# Patient Record
Sex: FEMALE | URBAN - METROPOLITAN AREA
[De-identification: names, ages, dates, MRNs, and addresses within clinical notes are randomized per-mention and may not be internally consistent; named-entity substitution may affect disease eponyms.]

---

## 2023-06-02 ENCOUNTER — OFFICE VISIT (OUTPATIENT)
Dept: URBAN - METROPOLITAN AREA CLINIC 74 | Facility: CLINIC | Age: 63
End: 2023-06-02

## 2023-06-29 ENCOUNTER — WEB ENCOUNTER (OUTPATIENT)
Dept: URBAN - METROPOLITAN AREA CLINIC 74 | Facility: CLINIC | Age: 63
End: 2023-06-29

## 2023-06-29 ENCOUNTER — OFFICE VISIT (OUTPATIENT)
Dept: URBAN - METROPOLITAN AREA CLINIC 74 | Facility: CLINIC | Age: 63
End: 2023-06-29
Payer: COMMERCIAL

## 2023-06-29 ENCOUNTER — LAB OUTSIDE AN ENCOUNTER (OUTPATIENT)
Dept: URBAN - METROPOLITAN AREA CLINIC 74 | Facility: CLINIC | Age: 63
End: 2023-06-29

## 2023-06-29 VITALS
BODY MASS INDEX: 25.04 KG/M2 | WEIGHT: 132.6 LBS | TEMPERATURE: 97.9 F | DIASTOLIC BLOOD PRESSURE: 70 MMHG | HEART RATE: 76 BPM | SYSTOLIC BLOOD PRESSURE: 110 MMHG | HEIGHT: 61 IN

## 2023-06-29 DIAGNOSIS — R76.8 HEPATITIS B CORE ANTIBODY POSITIVE: ICD-10-CM

## 2023-06-29 DIAGNOSIS — Z79.899 LONG TERM CURRENT USE OF IMMUNOSUPPRESSIVE DRUG: ICD-10-CM

## 2023-06-29 PROCEDURE — 99203 OFFICE O/P NEW LOW 30 MIN: CPT | Performed by: PHYSICIAN ASSISTANT

## 2023-06-29 RX ORDER — METHOTREXATE SODIUM 2.5 MG/1
AS DIRECTED TABLET ORAL
Status: ACTIVE | COMMUNITY

## 2023-06-29 RX ORDER — RITUXIMAB-PVVR 100 MG/10ML
AS DIRECTED INJECTION, SOLUTION INTRAVENOUS
Status: ACTIVE | COMMUNITY

## 2023-06-29 RX ORDER — SIMVASTATIN 40 MG
1 TABLET IN THE EVENING TABLET ORAL ONCE A DAY
Status: ACTIVE | COMMUNITY
Start: 2023-06-29

## 2023-07-05 LAB
A/G RATIO: 1.4
ABSOLUTE BASOPHILS: 61
ABSOLUTE EOSINOPHILS: 122
ABSOLUTE LYMPHOCYTES: 1324
ABSOLUTE MONOCYTES: 647
ABSOLUTE NEUTROPHILS: 3947
ALBUMIN: 4.2
ALKALINE PHOSPHATASE: 88
ALT (SGPT): 21
AST (SGOT): 20
BASOPHILS: 1
BILIRUBIN, TOTAL: 0.5
BUN/CREATININE RATIO: 6
BUN: 5
CALCIUM: 9.9
CARBON DIOXIDE, TOTAL: 25
CHLORIDE: 106
CREATININE: 0.79
EGFR: 83
EOSINOPHILS: 2
GASTRIN, SERUM: 28
GLOBULIN, TOTAL: 2.9
GLUCOSE: 86
HCV RNA, QUANTITATIVE REAL TIME PCR: <1.18
HCV RNA, QUANTITATIVE REAL TIME PCR: <15
HDV IGM: NEGATIVE
HEMATOCRIT: 41.4
HEMOGLOBIN: 14
HEP A AB, IGM: (no result)
HEP B CORE AB, IGM: (no result)
HEP BE AB: REACTIVE
HEP BE AG: (no result)
HEPATITIS A AB, TOTAL: REACTIVE
HEPATITIS B CORE AB TOTAL: REACTIVE
HEPATITIS B SURFACE AB IMMUNITY, QN: >1000
HEPATITIS B SURFACE ANTIGEN: (no result)
HEPATITIS D ANTIBODY,: NEGATIVE
HEPATITIS D VIRUS RNA,: NOT DETECTED
HEPATITIS D VIRUS RNA,: NOT DETECTED
LYMPHOCYTES: 21.7
MCH: 32.9
MCHC: 33.8
MCV: 97.2
MONOCYTES: 10.6
MPV: 11.1
NEUTROPHILS: 64.7
PLATELET COUNT: 266
POTASSIUM: 4.7
PROTEIN, TOTAL: 7.1
RDW: 14.4
RED BLOOD CELL COUNT: 4.26
SODIUM: 144
WHITE BLOOD CELL COUNT: 6.1

## 2023-07-11 ENCOUNTER — OFFICE VISIT (OUTPATIENT)
Dept: URBAN - METROPOLITAN AREA CLINIC 73 | Facility: CLINIC | Age: 63
End: 2023-07-11

## 2023-07-11 ENCOUNTER — TELEPHONE ENCOUNTER (OUTPATIENT)
Dept: URBAN - METROPOLITAN AREA CLINIC 74 | Facility: CLINIC | Age: 63
End: 2023-07-11

## 2023-08-02 ENCOUNTER — OFFICE VISIT (OUTPATIENT)
Dept: URBAN - METROPOLITAN AREA CLINIC 74 | Facility: CLINIC | Age: 63
End: 2023-08-02

## 2023-08-02 RX ORDER — SIMVASTATIN 40 MG
1 TABLET IN THE EVENING TABLET ORAL ONCE A DAY
COMMUNITY
Start: 2023-06-29

## 2023-08-02 RX ORDER — METHOTREXATE SODIUM 2.5 MG/1
AS DIRECTED TABLET ORAL
COMMUNITY

## 2023-08-02 RX ORDER — RITUXIMAB-PVVR 100 MG/10ML
AS DIRECTED INJECTION, SOLUTION INTRAVENOUS
COMMUNITY

## 2023-08-18 ENCOUNTER — OFFICE VISIT (OUTPATIENT)
Dept: URBAN - METROPOLITAN AREA CLINIC 39 | Facility: CLINIC | Age: 63
End: 2023-08-18
Payer: COMMERCIAL

## 2023-08-18 DIAGNOSIS — N28.1 RENAL CYST: ICD-10-CM

## 2023-08-18 PROCEDURE — 76705 ECHO EXAM OF ABDOMEN: CPT | Performed by: INTERNAL MEDICINE

## 2023-08-21 ENCOUNTER — DASHBOARD ENCOUNTERS (OUTPATIENT)
Age: 63
End: 2023-08-21

## 2023-08-22 ENCOUNTER — OFFICE VISIT (OUTPATIENT)
Dept: URBAN - METROPOLITAN AREA CLINIC 73 | Facility: CLINIC | Age: 63
End: 2023-08-22

## 2023-08-22 ENCOUNTER — OFFICE VISIT (OUTPATIENT)
Dept: URBAN - METROPOLITAN AREA CLINIC 74 | Facility: CLINIC | Age: 63
End: 2023-08-22

## 2023-08-28 ENCOUNTER — OFFICE VISIT (OUTPATIENT)
Dept: URBAN - METROPOLITAN AREA CLINIC 74 | Facility: CLINIC | Age: 63
End: 2023-08-28
Payer: COMMERCIAL

## 2023-08-28 VITALS
BODY MASS INDEX: 25.98 KG/M2 | WEIGHT: 137.6 LBS | HEIGHT: 61 IN | DIASTOLIC BLOOD PRESSURE: 80 MMHG | SYSTOLIC BLOOD PRESSURE: 120 MMHG | HEART RATE: 92 BPM | TEMPERATURE: 98.1 F

## 2023-08-28 DIAGNOSIS — Z79.899 LONG TERM CURRENT USE OF IMMUNOSUPPRESSIVE DRUG: ICD-10-CM

## 2023-08-28 DIAGNOSIS — Z86.19 HISTORY OF HEPATITIS B VIRUS INFECTION CONFERRING IMMUNITY: ICD-10-CM

## 2023-08-28 PROCEDURE — 99213 OFFICE O/P EST LOW 20 MIN: CPT | Performed by: INTERNAL MEDICINE

## 2023-08-28 RX ORDER — METHOTREXATE SODIUM 2.5 MG/1
AS DIRECTED TABLET ORAL
Status: ACTIVE | COMMUNITY

## 2023-08-28 RX ORDER — SIMVASTATIN 40 MG
1 TABLET IN THE EVENING TABLET ORAL ONCE A DAY
Status: ACTIVE | COMMUNITY
Start: 2023-06-29

## 2023-08-28 RX ORDER — RITUXIMAB-PVVR 100 MG/10ML
AS DIRECTED INJECTION, SOLUTION INTRAVENOUS
Status: ACTIVE | COMMUNITY

## 2023-08-28 NOTE — HPI-TODAY'S VISIT:
The patient is 62-year-old male with past medical history as below is presenting to our clinic today to discuss her recent lab and RUQ US results.  No GI issues today.   -- The patient denies dyspepsia, dysphagia, odynophagia, hemoptysis, hematemesis, nausea, vomiting, regurgitation, melena, constipation, diarrhea, abdominal pain, hematochezia, fever, chills, chest pain, SOB, or any other GI complaints today.  -- The patient denies ETOH, Tobacco, and Illicit drug use. -- The patient is up to date with Flu, Pneumonia, Shingles, and COVID vaccine 3/3. -- Denies NSAID's.   Diagnostic studies: -- RUQ US on 08/18/2023 with 1.3 cm simple right renal cyst otherwise unremarkable liver and gallbladder.  -- Labs on 06/29/2023 with positive Hepatitis Be Antibody, Hepatitis B core total Antibosy positive, Hepatitis A Antibody positive, and Hepatitis B Antibody positive at greater than 1000, which means immunity to Hepatitia A and B.    -- Labs on 04/17/2023 CMP with ALP 83, AST 22, ALT 24, and TB 0.8.  CBC with WBC 7.6, hemoglobin 14.3, hematocrit 44.8, and platelet 311.  ESR 16.  CRP 1.8.  LEEROY negative.  .  H LA-B27 negative.  Hepatitis B surface antibody nonreactive. Hepatitis B core total reactive.  Hepatitis B core IgM antibody nonreactive.

## 2024-09-15 NOTE — HPI-TODAY'S VISIT:
The patient is 62-year-old male with past medical history as below is presenting to our clinic today to discuss her recent labs and imaging.   -- The patient denies dyspepsia, dysphagia, odynophagia, hemoptysis, hematemesis, nausea, vomiting, regurgitation, melena, constipation, diarrhea, abdominal pain, hematochezia, fever, chills, chest pain, SOB, or any other GI complaints today.  -- The patient denies ETOH, Tobacco, and Illicit drug use. -- The patient is up to date with Flu, Pneumonia, Shingles, and COVID vaccine 3/3. -- Denies NSAID's.   Diagnostic studies: -- Labs on 06/29/2023 with positive Hepatitis Be Antibody, Hepatitis B core total Antibosy positive, Hepatitis A Antibody positive, and Hepatitis B Antibosy positive at greater than 1000, which means immunity to Hepatitia A and B.    -- Labs on 04/17/2023 CMP with ALP 83, AST 22, ALT 24, and TB 0.8.  CBC with WBC 7.6, hemoglobin 14.3, hematocrit 44.8, and platelet 311.  ESR 16.  CRP 1.8.  LEEROY negative.  .  H LA-B27 negative.  Hepatitis B surface antibody nonreactive. Hepatitis B core total reactive.  Hepatitis B core IgM antibody nonreactive.
No